# Patient Record
Sex: FEMALE | Race: BLACK OR AFRICAN AMERICAN | NOT HISPANIC OR LATINO | Employment: FULL TIME | URBAN - METROPOLITAN AREA
[De-identification: names, ages, dates, MRNs, and addresses within clinical notes are randomized per-mention and may not be internally consistent; named-entity substitution may affect disease eponyms.]

---

## 2018-06-06 ENCOUNTER — TRANSCRIBE ORDERS (OUTPATIENT)
Dept: URGENT CARE | Facility: CLINIC | Age: 48
End: 2018-06-06

## 2018-06-06 ENCOUNTER — APPOINTMENT (OUTPATIENT)
Dept: RADIOLOGY | Facility: CLINIC | Age: 48
End: 2018-06-06
Attending: FAMILY MEDICINE

## 2018-06-06 DIAGNOSIS — Z92.89 HISTORY OF POSITIVE PPD: ICD-10-CM

## 2018-06-06 DIAGNOSIS — Z92.89 HISTORY OF POSITIVE PPD: Primary | ICD-10-CM

## 2018-06-06 PROCEDURE — 71045 X-RAY EXAM CHEST 1 VIEW: CPT

## 2019-08-03 ENCOUNTER — APPOINTMENT (OUTPATIENT)
Dept: RADIOLOGY | Facility: CLINIC | Age: 49
End: 2019-08-03
Payer: OTHER MISCELLANEOUS

## 2019-08-03 ENCOUNTER — OFFICE VISIT (OUTPATIENT)
Dept: URGENT CARE | Facility: CLINIC | Age: 49
End: 2019-08-03
Payer: OTHER MISCELLANEOUS

## 2019-08-03 VITALS
SYSTOLIC BLOOD PRESSURE: 140 MMHG | DIASTOLIC BLOOD PRESSURE: 85 MMHG | WEIGHT: 147 LBS | HEART RATE: 91 BPM | BODY MASS INDEX: 26.05 KG/M2 | OXYGEN SATURATION: 99 % | HEIGHT: 63 IN | TEMPERATURE: 99 F | RESPIRATION RATE: 17 BRPM

## 2019-08-03 DIAGNOSIS — S49.91XA INJURY OF RIGHT SHOULDER, INITIAL ENCOUNTER: ICD-10-CM

## 2019-08-03 DIAGNOSIS — S49.91XA INJURY OF RIGHT SHOULDER, INITIAL ENCOUNTER: Primary | ICD-10-CM

## 2019-08-03 PROCEDURE — 73030 X-RAY EXAM OF SHOULDER: CPT

## 2019-08-03 PROCEDURE — 99203 OFFICE O/P NEW LOW 30 MIN: CPT | Performed by: FAMILY MEDICINE

## 2019-08-03 NOTE — PROGRESS NOTES
[facility]        NAME: Jeb Sharma is a 52 y o  female  : 1970    MRN: [id number]  DATE: August 3, 2019  TIME: 4:35 PM    Assessment and Plan   Injury of right shoulder, initial encounter [S49 [id number]]  1  Injury of right shoulder, initial encounter  XR shoulder 2+ vw right    Ambulatory referral to Orthopedic Surgery    Ambulatory referral to Physical Therapy     X-ray does not appear to show fracture; official review pending  Unable to compare right AC joint to the left on x-ray for possible dissociation  Be that as it may, she appears to have a right rotator cuff injury per clinical evaluation  Referred to physical therapy and orthopedic surgery for further evaluation and management  May likely require an MRI in the near future  Placed in a shoulder sling and told to ice the right shoulder at least 3 times daily  May continue with Motrin and Tylenol for pain relief  Patient Instructions     Follow up with PCP in 3-5 days  Proceed to  ER if symptoms worsen  Chief Complaint     Chief Complaint   Patient presents with    Shoulder Injury     Pt reports of right shoulder injury occurred on the job with worsening pain over the course of 5 days   History of Present Illness       66-year-old right-hand-dominant female presents today due to right shoulder pain from a mechanical fall  While at work, she slipped on a client's blanket and fell forward onto her knees landing onto the right shoulder  There was initial pain, but she thought that it would spontaneously resolved  Completed her shift (this occurred on Monday, 5 days ago)  Was off the next 2 days and was taking ibuprofen with improvement in symptoms  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  Reports that despite taking ibuprofen, her symptoms progressively worsened  Finally reported this as a work injury to her employer the next day (yesterday evening)    Is unable to weight to see worker's Comp clinician and presents here for evaluation  Review of Systems   Review of Systems   Constitutional: Negative for chills and fever  Respiratory: Negative for shortness of breath  Cardiovascular: Negative for chest pain  Musculoskeletal: Positive for arthralgias  Neurological: Negative for dizziness and headaches  Current Medications     No current outpatient medications on file  Current Allergies     Allergies as of 08/03/2019    (No Known Allergies)            The following portions of the patient's history were reviewed and updated as appropriate: allergies, current medications, past family history, past medical history, past social history, past surgical history and problem list      History reviewed  No pertinent past medical history  History reviewed  No pertinent surgical history  History reviewed  No pertinent family history  Medications have been verified  Objective   /85   Pulse 91   Temp 99 °F (37 2 °C)   Resp 17   Ht 5' 3" (1 6 m)   Wt 66 7 kg (147 lb)   SpO2 99%   BMI 26 04 kg/m²        Physical Exam     Physical Exam   Constitutional: She is oriented to person, place, and time  She appears well-developed and well-nourished  She appears distressed (Discomfort from right shoulder pain)  HENT:   Head: Normocephalic and atraumatic  Eyes: Conjunctivae are normal    Pulmonary/Chest: Effort normal    Musculoskeletal: She exhibits tenderness  She exhibits no edema or deformity  Point tenderness over the right distal clavicle and acromion  Decreased active ROM of the right shoulder with abduction and forward flexion  Pain reproduced with resisted external rotation  Positive Neer sign with right shoulder  Neurological: She is alert and oriented to person, place, and time  Skin: Skin is warm  She is not diaphoretic  Psychiatric: She has a normal mood and affect   Her behavior is normal  Judgment and thought content normal  Nursing note and vitals reviewed